# Patient Record
Sex: FEMALE | Race: WHITE | NOT HISPANIC OR LATINO | Employment: FULL TIME | URBAN - METROPOLITAN AREA
[De-identification: names, ages, dates, MRNs, and addresses within clinical notes are randomized per-mention and may not be internally consistent; named-entity substitution may affect disease eponyms.]

---

## 2021-07-12 ENCOUNTER — OFFICE VISIT (OUTPATIENT)
Dept: INTERNAL MEDICINE | Facility: CLINIC | Age: 56
End: 2021-07-12

## 2021-07-12 VITALS
HEART RATE: 99 BPM | DIASTOLIC BLOOD PRESSURE: 70 MMHG | WEIGHT: 235.5 LBS | HEIGHT: 60 IN | TEMPERATURE: 97.5 F | BODY MASS INDEX: 46.23 KG/M2 | SYSTOLIC BLOOD PRESSURE: 118 MMHG | OXYGEN SATURATION: 98 %

## 2021-07-12 DIAGNOSIS — Z01.419 ENCOUNTER FOR WELL WOMAN EXAM WITH ROUTINE GYNECOLOGICAL EXAM: ICD-10-CM

## 2021-07-12 DIAGNOSIS — M79.7 FIBROMYALGIA: ICD-10-CM

## 2021-07-12 DIAGNOSIS — K21.9 HIATAL HERNIA WITH GERD: ICD-10-CM

## 2021-07-12 DIAGNOSIS — I10 ESSENTIAL HYPERTENSION: ICD-10-CM

## 2021-07-12 DIAGNOSIS — R53.82 CHRONIC FATIGUE: ICD-10-CM

## 2021-07-12 DIAGNOSIS — K44.9 HIATAL HERNIA WITH GERD: ICD-10-CM

## 2021-07-12 DIAGNOSIS — K76.0 NONALCOHOLIC FATTY LIVER DISEASE: ICD-10-CM

## 2021-07-12 DIAGNOSIS — E28.2 PCOS (POLYCYSTIC OVARIAN SYNDROME): ICD-10-CM

## 2021-07-12 DIAGNOSIS — R06.02 SOB (SHORTNESS OF BREATH): ICD-10-CM

## 2021-07-12 DIAGNOSIS — E11.65 TYPE 2 DIABETES MELLITUS WITH HYPERGLYCEMIA, WITHOUT LONG-TERM CURRENT USE OF INSULIN (HCC): Primary | ICD-10-CM

## 2021-07-12 DIAGNOSIS — Z00.00 HEALTHCARE MAINTENANCE: ICD-10-CM

## 2021-07-12 PROBLEM — E11.9 TYPE 2 DIABETES MELLITUS, WITHOUT LONG-TERM CURRENT USE OF INSULIN (HCC): Status: ACTIVE | Noted: 2021-07-12

## 2021-07-12 PROBLEM — E66.01 CLASS 3 SEVERE OBESITY WITH SERIOUS COMORBIDITY AND BODY MASS INDEX (BMI) OF 45.0 TO 49.9 IN ADULT (HCC): Status: ACTIVE | Noted: 2021-07-12

## 2021-07-12 PROBLEM — M19.90 ARTHRITIS: Status: ACTIVE | Noted: 2021-07-12

## 2021-07-12 PROCEDURE — 99386 PREV VISIT NEW AGE 40-64: CPT | Performed by: NURSE PRACTITIONER

## 2021-07-12 PROCEDURE — 93000 ELECTROCARDIOGRAM COMPLETE: CPT | Performed by: NURSE PRACTITIONER

## 2021-07-12 RX ORDER — LISINOPRIL AND HYDROCHLOROTHIAZIDE 12.5; 1 MG/1; MG/1
TABLET ORAL
COMMUNITY
Start: 2021-05-18

## 2021-07-12 RX ORDER — OMEPRAZOLE/SODIUM BICARBONATE 20MG-1.1G
CAPSULE ORAL
COMMUNITY
End: 2022-10-11

## 2021-07-12 NOTE — PROGRESS NOTES
"Subjective   Chen Stockton is a 55 y.o. female.     Chief Complaint   Patient presents with   • Diabetes     Pt is here as a new pt to [facility] care. Pt is fasting fo labs and pt c/o sob X 2 months.   • Shortness of Breath        History of Present Illness   She is here today as a new patient to establish care. She feels her health is \"not great.\" She has lost 15 lbs. She is trying to eat healthier now, bringing her lunch to work. She is seeing a chiropractor for chronic low back and hip pain with improvement in pain. She is not currently exercising.  Previous PCP in [facility] with last lab work completed over 3 months ago.     She has noticed intermittent SOA over the past 2 months. Symptoms began prior to moving from [facility]. She has episodes of \"gasping\" while awake and intermittent dry to productive cough. She has had some congestion recently, but this has improved. She is using benadryl at bedtime. SOA and gasping is improving. Gasping occurs at rest, not associated with activity. Denies any wheezing or nocturnal awakenings, orthopnea, PND, syncope or chest pain, has occasional snoring at night. She was tested for WEI in the past, negative work up. She has been trying to work on weight loss, down 15 lbs.     DM2- dx in 2014. She is currently on metformin 1000 mg BID. Last blood work over 3 months ago. Patient doing well with current medication regimen, compliant with medication schedule, denies adverse effects. She is due for diabetic eye exam.   HTN/LE edema- currently on lisinopril-hctz 10-12.5 mg daily. She still has bilateral LE edema daily, improves with elevation. She does not add salt to her diet. Denies any orthostasis. She completed a stress test and ECHO several years ago secondary to edema, negative work up per patient.   Hiatal hernia with GERD- currently on Zegerid OTC with symptom control. She had an EGD in 2018 or 2019, positive for H pylori, treated with prevpack. She has symptom control with reflux " precautions and daily Zegrid.  Fibromyalgia- dx several years ago. She has been seeing a chiropractor with improvement in pain. She is not currently exercising.   Fatty Liver- dx several years ago. She had a liver U/S completed showing fatty liver.     Colon cancer screen- she thinks she had a c-scope in 2018 or 2019 with EGD. C-scope was normal, recommended re-screen in 10 years.     GYN- G0. Hx of PCOS. Menses have been irregular since menarche. Sees GYN. Needs a referral to GYN in [place]. Last pap 2018, normal. No hx of abnormal. Never had a mammogram.     She lives with her brother who is partially disabled. She helps take care of him.     The following portions of the patient's history were reviewed and updated as appropriate: allergies, current medications, past family history, past medical history, past social history, past surgical history and problem list.    Review of Systems   Constitutional: Positive for fatigue (chronic). Negative for chills and fever.   HENT: Positive for postnasal drip. Negative for sore throat and trouble swallowing.    Eyes: Negative for visual disturbance.   Respiratory: Positive for cough and shortness of breath. Negative for apnea, choking, chest tightness and wheezing.    Cardiovascular: Positive for leg swelling (intermittent, improves with elevation). Negative for chest pain and palpitations.   Gastrointestinal: Negative for abdominal pain, nausea, vomiting and GERD.   Musculoskeletal: Positive for arthralgias (chronic).   Neurological: Negative.    Psychiatric/Behavioral: Negative for suicidal ideas and depressed mood. The patient is not nervous/anxious.        Objective   Physical Exam  Constitutional:       Appearance: She is well-developed.   Neck:      Thyroid: No thyroid mass, thyromegaly or thyroid tenderness.      Vascular: No carotid bruit.      Trachea: Trachea normal.   Cardiovascular:      Rate and Rhythm: Normal rate and regular rhythm.      Chest Wall: PMI is  not displaced.      Pulses:           Radial pulses are 2+ on the right side and 2+ on the left side.        Dorsalis pedis pulses are 2+ on the right side and 2+ on the left side.        Posterior tibial pulses are 2+ on the right side and 2+ on the left side.      Heart sounds: S1 normal and S2 normal.      Comments: Trace bilateral LE edema    Pulmonary:      Effort: Pulmonary effort is normal.      Breath sounds: Normal breath sounds.      Comments: Intermittent cough with deep breathing    Musculoskeletal:      Right lower leg: Edema present.      Left lower leg: Edema present.   Lymphadenopathy:      Head:      Right side of head: No submental, submandibular, tonsillar or occipital adenopathy.      Left side of head: No submental, submandibular, tonsillar or occipital adenopathy.      Cervical: No cervical adenopathy.   Skin:     General: Skin is warm and dry.      Capillary Refill: Capillary refill takes less than 2 seconds.      Nails: There is no clubbing.   Neurological:      Mental Status: She is alert and oriented to person, place, and time.   Psychiatric:         Attention and Perception: Attention normal.         Mood and Affect: Mood and affect normal.         Speech: Speech normal.         Behavior: Behavior normal.         Thought Content: Thought content normal.         Cognition and Memory: Cognition normal.         Vitals:    07/12/21 0825   BP: 118/70   Pulse: 99   Temp: 97.5 °F (36.4 °C)   SpO2: 98%      Body mass index is 45.99 kg/m².    Assessment/Plan   Diagnoses and all orders for this visit:    1. Type 2 diabetes mellitus with hyperglycemia, without long-term current use of insulin (CMS/[facility]) (Primary)  -     CBC & Differential  -     Comprehensive Metabolic Panel  -     Hepatitis C Antibody  -     Hemoglobin A1c  -     MicroAlbumin, Urine, Random - Urine, Clean Catch  -     Lipid Panel With LDL / HDL Ratio  -     TSH Rfx On Abnormal To Free T4  -     Vitamin B12  -     Folate  -      Ambulatory Referral to Ophthalmology    2. Chronic fatigue  -     CBC & Differential  -     Comprehensive Metabolic Panel  -     Hepatitis C Antibody  -     Hemoglobin A1c  -     MicroAlbumin, Urine, Random - Urine, Clean Catch  -     Lipid Panel With LDL / HDL Ratio  -     TSH Rfx On Abnormal To Free T4  -     Vitamin B12  -     Folate    3. Healthcare maintenance  -     CBC & Differential  -     Comprehensive Metabolic Panel  -     Hepatitis C Antibody  -     Hemoglobin A1c  -     MicroAlbumin, Urine, Random - Urine, Clean Catch  -     Lipid Panel With LDL / HDL Ratio  -     TSH Rfx On Abnormal To Free T4  -     Vitamin B12  -     Folate    4. SOB (shortness of breath)  -     CBC & Differential  -     Comprehensive Metabolic Panel  -     Hepatitis C Antibody  -     Hemoglobin A1c  -     MicroAlbumin, Urine, Random - Urine, Clean Catch  -     Lipid Panel With LDL / HDL Ratio  -     TSH Rfx On Abnormal To Free T4  -     Vitamin B12  -     Folate  -     ECG 12 Lead    5. Essential hypertension  -     CBC & Differential  -     Comprehensive Metabolic Panel  -     Hepatitis C Antibody  -     Hemoglobin A1c  -     MicroAlbumin, Urine, Random - Urine, Clean Catch  -     Lipid Panel With LDL / HDL Ratio  -     TSH Rfx On Abnormal To Free T4  -     Vitamin B12  -     Folate  -     Ambulatory Referral to Ophthalmology    6. Hiatal hernia with GERD  -     CBC & Differential  -     Comprehensive Metabolic Panel  -     Hepatitis C Antibody  -     Hemoglobin A1c  -     MicroAlbumin, Urine, Random - Urine, Clean Catch  -     Lipid Panel With LDL / HDL Ratio  -     TSH Rfx On Abnormal To Free T4  -     Vitamin B12  -     Folate    7. PCOS (polycystic ovarian syndrome)  -     CBC & Differential  -     Comprehensive Metabolic Panel  -     Hepatitis C Antibody  -     Hemoglobin A1c  -     MicroAlbumin, Urine, Random - Urine, Clean Catch  -     Lipid Panel With LDL / HDL Ratio  -     TSH Rfx On Abnormal To Free T4  -     Vitamin  B12  -     Folate  -     Ambulatory Referral to Gynecology    8. Fibromyalgia  -     CBC & Differential  -     Comprehensive Metabolic Panel  -     Hepatitis C Antibody  -     Hemoglobin A1c  -     MicroAlbumin, Urine, Random - Urine, Clean Catch  -     Lipid Panel With LDL / HDL Ratio  -     TSH Rfx On Abnormal To Free T4  -     Vitamin B12  -     Folate    9. Nonalcoholic fatty liver disease  -     CBC & Differential  -     Comprehensive Metabolic Panel  -     Hepatitis C Antibody  -     Hemoglobin A1c  -     MicroAlbumin, Urine, Random - Urine, Clean Catch  -     Lipid Panel With LDL / HDL Ratio  -     TSH Rfx On Abnormal To Free T4  -     Vitamin B12  -     Folate    10. Encounter for well woman exam with routine gynecological exam  -     Ambulatory Referral to Gynecology      SOB/DM2/Obesity/ECG today- Sinus rhythm with possible LVH, no ST-T segment changes, no baseline for comparison.    1. DM2- check labs. Continue to work on healthy eating and weight loss. Would consider addition of GLP1 or SGLT2 based on A1c to assist with weight loss and LE edema. Referral placed to ophthalmology for diabetic eye exam.  2. HTN/LE edema- BP well controlled. Instructed her to continue to work on weight loss and limiting sodium intake. Elevate legs above heart level. Work on starting regular exercise. Suggest wearing compression socks while at work.  3. Obesity- down 15 lbs. Promote weight reduction through healthy diet incorporating lean proteins, non-starchy vegetables, complex carbs, healthy fats, and moderate fruit intake. Work on portion control. Stay adequately hydrated with water. Limit sugary drinks. Incorporate regular aerobic exercise and strength-training.   4. SOB- ?Obesity versus stress/anxiety as it has improved some as she has lost weight. ECG today sinus rhythm with possible LVH, no ST-T segment changes, no baseline for comparison. Check labs today. Would consider PFT evaluation and possible referral to  cardiology for further evaluation as she is at high risk for ASCVD and has family hx of CHF.   5. Fibromyalgia- encouraged her to continue seeing chiropractor. Recommend that she start low impact regular exercise. Suggested yoga, Julio Chi and swimming/water aerobics. Continue to work on weight loss.  6. PCOS- referral placed to GYN for well woman exam.     C-scope/EGD- records requested    Check fasting labs today. F/u in 3 weeks for labs and CPE.

## 2021-07-13 LAB
ALBUMIN SERPL-MCNC: 3.9 G/DL (ref 3.5–5.2)
ALBUMIN/GLOB SERPL: 1.1 G/DL
ALP SERPL-CCNC: 97 U/L (ref 39–117)
ALT SERPL-CCNC: 17 U/L (ref 1–33)
AST SERPL-CCNC: 21 U/L (ref 1–32)
BASOPHILS # BLD AUTO: 0.04 10*3/MM3 (ref 0–0.2)
BASOPHILS NFR BLD AUTO: 0.6 % (ref 0–1.5)
BILIRUB SERPL-MCNC: 0.4 MG/DL (ref 0–1.2)
BUN SERPL-MCNC: 13 MG/DL (ref 6–20)
BUN/CREAT SERPL: 18.1 (ref 7–25)
CALCIUM SERPL-MCNC: 9.7 MG/DL (ref 8.6–10.5)
CHLORIDE SERPL-SCNC: 101 MMOL/L (ref 98–107)
CHOLEST SERPL-MCNC: 175 MG/DL (ref 0–200)
CO2 SERPL-SCNC: 26.1 MMOL/L (ref 22–29)
CREAT SERPL-MCNC: 0.72 MG/DL (ref 0.57–1)
EOSINOPHIL # BLD AUTO: 0.13 10*3/MM3 (ref 0–0.4)
EOSINOPHIL NFR BLD AUTO: 2 % (ref 0.3–6.2)
ERYTHROCYTE [DISTWIDTH] IN BLOOD BY AUTOMATED COUNT: 13 % (ref 12.3–15.4)
FOLATE SERPL-MCNC: 10.2 NG/ML (ref 4.78–24.2)
GLOBULIN SER CALC-MCNC: 3.5 GM/DL
GLUCOSE SERPL-MCNC: 230 MG/DL (ref 65–99)
HBA1C MFR BLD: 10.6 % (ref 4.8–5.6)
HCT VFR BLD AUTO: 34.9 % (ref 34–46.6)
HCV AB S/CO SERPL IA: <0.1 S/CO RATIO (ref 0–0.9)
HDLC SERPL-MCNC: 61 MG/DL (ref 40–60)
HGB BLD-MCNC: 11.5 G/DL (ref 12–15.9)
IMM GRANULOCYTES # BLD AUTO: 0.01 10*3/MM3 (ref 0–0.05)
IMM GRANULOCYTES NFR BLD AUTO: 0.2 % (ref 0–0.5)
LDLC SERPL CALC-MCNC: 96 MG/DL (ref 0–100)
LDLC/HDLC SERPL: 1.53 {RATIO}
LYMPHOCYTES # BLD AUTO: 1.83 10*3/MM3 (ref 0.7–3.1)
LYMPHOCYTES NFR BLD AUTO: 28.8 % (ref 19.6–45.3)
MCH RBC QN AUTO: 28.7 PG (ref 26.6–33)
MCHC RBC AUTO-ENTMCNC: 33 G/DL (ref 31.5–35.7)
MCV RBC AUTO: 87 FL (ref 79–97)
MICROALBUMIN UR-MCNC: 13.6 UG/ML
MONOCYTES # BLD AUTO: 0.43 10*3/MM3 (ref 0.1–0.9)
MONOCYTES NFR BLD AUTO: 6.8 % (ref 5–12)
NEUTROPHILS # BLD AUTO: 3.91 10*3/MM3 (ref 1.7–7)
NEUTROPHILS NFR BLD AUTO: 61.6 % (ref 42.7–76)
NRBC BLD AUTO-RTO: 0 /100 WBC (ref 0–0.2)
PLATELET # BLD AUTO: 237 10*3/MM3 (ref 140–450)
POTASSIUM SERPL-SCNC: 4.3 MMOL/L (ref 3.5–5.2)
PROT SERPL-MCNC: 7.4 G/DL (ref 6–8.5)
RBC # BLD AUTO: 4.01 10*6/MM3 (ref 3.77–5.28)
SODIUM SERPL-SCNC: 138 MMOL/L (ref 136–145)
TRIGL SERPL-MCNC: 102 MG/DL (ref 0–150)
TSH SERPL DL<=0.005 MIU/L-ACNC: 2.16 UIU/ML (ref 0.27–4.2)
VIT B12 SERPL-MCNC: 618 PG/ML (ref 211–946)
VLDLC SERPL CALC-MCNC: 18 MG/DL (ref 5–40)
WBC # BLD AUTO: 6.35 10*3/MM3 (ref 3.4–10.8)

## 2021-07-15 DIAGNOSIS — E66.01 CLASS 3 SEVERE OBESITY WITH SERIOUS COMORBIDITY AND BODY MASS INDEX (BMI) OF 45.0 TO 49.9 IN ADULT, UNSPECIFIED OBESITY TYPE (HCC): ICD-10-CM

## 2021-07-15 DIAGNOSIS — K76.0 NONALCOHOLIC FATTY LIVER DISEASE: ICD-10-CM

## 2021-07-15 DIAGNOSIS — E28.2 PCOS (POLYCYSTIC OVARIAN SYNDROME): ICD-10-CM

## 2021-07-15 DIAGNOSIS — E11.65 TYPE 2 DIABETES MELLITUS WITH HYPERGLYCEMIA, WITHOUT LONG-TERM CURRENT USE OF INSULIN (HCC): Primary | ICD-10-CM

## 2021-07-16 LAB
IRON SATN MFR SERPL: 11 % (ref 20–50)
IRON SERPL-MCNC: 51 MCG/DL (ref 37–145)
Lab: NORMAL
TIBC SERPL-MCNC: 456 MCG/DL
UIBC SERPL-MCNC: 405 MCG/DL (ref 112–346)
WRITTEN AUTHORIZATION: NORMAL

## 2021-08-11 DIAGNOSIS — R06.02 SHORTNESS OF BREATH: ICD-10-CM

## 2021-08-11 DIAGNOSIS — R53.82 CHRONIC FATIGUE: Primary | ICD-10-CM

## 2021-08-12 ENCOUNTER — OFFICE VISIT (OUTPATIENT)
Dept: CARDIOLOGY | Facility: CLINIC | Age: 56
End: 2021-08-12

## 2021-08-12 VITALS
BODY MASS INDEX: 46.53 KG/M2 | DIASTOLIC BLOOD PRESSURE: 74 MMHG | WEIGHT: 237 LBS | SYSTOLIC BLOOD PRESSURE: 128 MMHG | HEIGHT: 60 IN | HEART RATE: 99 BPM | RESPIRATION RATE: 18 BRPM

## 2021-08-12 DIAGNOSIS — R06.09 DYSPNEA ON EXERTION: Primary | ICD-10-CM

## 2021-08-12 DIAGNOSIS — I10 ESSENTIAL HYPERTENSION: ICD-10-CM

## 2021-08-12 DIAGNOSIS — R07.89 CHEST PAIN, ATYPICAL: ICD-10-CM

## 2021-08-12 PROCEDURE — 99204 OFFICE O/P NEW MOD 45 MIN: CPT | Performed by: INTERNAL MEDICINE

## 2021-08-12 NOTE — PROGRESS NOTES
Subjective:     Encounter Date:08/12/2021      Patient ID: Chen Stockton is a 55 y.o. female.    Chief Complaint:  Chief Complaint   Patient presents with   • Shortness of Breath       HPI:  Ms Stockton is a 56 yo referred for evaluation of fatigue, dyspnea and chest pain.  Her past medical history is significant for HTN, HLD, and DM.  She describes having a 3 month history of dyspnea which occurs with exertion but also at rest she feels the need to breath deeply.  She does not have PND or orthopnea but does have chronic right ankle edema.  She has occasional palpitations but no sustained tachycardia.  She also describes having substernal chest discomfort like a weight on her chest which is nonradiating and is not associated with nausea or diaphoresis.  The pain can occur at rest or with exertion.    She has not had any prior cardiac evaluation.         She lives with her brother who is partially disabled. She helps take care of him.   The following portions of the patient's history were reviewed and updated as appropriate: allergies, current medications, past family history, past medical history, past social history, past surgical history and problem list.    Problem List:  Patient Active Problem List   Diagnosis   • Type 2 diabetes mellitus, without long-term current use of insulin (CMS/HCC)   • Arthritis   • PCOS (polycystic ovarian syndrome)   • Hiatal hernia with GERD   • Essential hypertension   • Fibromyalgia   • Class 3 severe obesity with serious comorbidity and body mass index (BMI) of 45.0 to 49.9 in adult (CMS/HCC)   • Nonalcoholic fatty liver disease   • Chest pain, atypical   • Dyspnea on exertion       Past Medical History:  Past Medical History:   Diagnosis Date   • Diabetes mellitus (CMS/HCC)    • Hypertension        Past Surgical History:  Past Surgical History:   Procedure Laterality Date   • SKIN GRAFT  1988       Social History:  Social History     Socioeconomic History   • Marital status:  "     Spouse name: Not on file   • Number of children: Not on file   • Years of education: Not on file   • Highest education level: Not on file   Tobacco Use   • Smoking status: Former Smoker     Packs/day: 1.00     Years: 10.00     Pack years: 10.00     Types: Cigarettes     Quit date:      Years since quittin.6   • Smokeless tobacco: Never Used   Vaping Use   • Vaping Use: Never used   Substance and Sexual Activity   • Alcohol use: Not Currently   • Drug use: Never   • Sexual activity: Not Currently       Allergies:  No Known Allergies    Immunizations:  Immunization History   Administered Date(s) Administered   • COVID-19 (PFIZER) 2021, 2021       ROS:  Review of Systems   Constitutional: Positive for malaise/fatigue.   Cardiovascular: Positive for chest pain, dyspnea on exertion, irregular heartbeat and leg swelling. Negative for near-syncope, orthopnea, palpitations, paroxysmal nocturnal dyspnea and syncope.   Respiratory: Positive for shortness of breath.    All other systems reviewed and are negative.         Objective:         /74 (BP Location: Right arm, Patient Position: Sitting)   Pulse 99   Resp 18   Ht 152.4 cm (60\")   Wt 108 kg (237 lb)   BMI 46.29 kg/m²     Constitutional:       General: Not in acute distress.     Appearance: Well-developed.   Eyes:      General: No scleral icterus.     Conjunctiva/sclera: Conjunctivae normal.      Pupils: Pupils are equal, round, and reactive to light.   HENT:      Head: Normocephalic and atraumatic.   Neck:      Thyroid: No thyromegaly.   Pulmonary:      Effort: Pulmonary effort is normal.      Breath sounds: Normal breath sounds.   Cardiovascular:      Normal rate. Regular rhythm.   Abdominal:      General: Bowel sounds are normal.      Palpations: Abdomen is soft.   Musculoskeletal: Normal range of motion.      Cervical back: Normal range of motion. Skin:     General: Skin is warm and dry.   Neurological:      Mental Status: " Alert and oriented to person, place, and time.         In-Office Procedure(s):  Procedures    ASCVD RIsk Score::  The 10-year ASCVD risk score (Beto ELIZONDO Jr., et al., 2013) is: 3.6%    Values used to calculate the score:      Age: 55 years      Sex: Female      Is Non- : No      Diabetic: Yes      Tobacco smoker: No      Systolic Blood Pressure: 128 mmHg      Is BP treated: Yes      HDL Cholesterol: 61 mg/dL      Total Cholesterol: 175 mg/dL    Recent Radiology:  Imaging Results (Most Recent)     None          Lab Review:   Office Visit on 07/12/2021   Component Date Value   • WBC 07/12/2021 6.35    • RBC 07/12/2021 4.01    • Hemoglobin 07/12/2021 11.5*   • Hematocrit 07/12/2021 34.9    • MCV 07/12/2021 87.0    • MCH 07/12/2021 28.7    • MCHC 07/12/2021 33.0    • RDW 07/12/2021 13.0    • Platelets 07/12/2021 237    • Neutrophil Rel % 07/12/2021 61.6    • Lymphocyte Rel % 07/12/2021 28.8    • Monocyte Rel % 07/12/2021 6.8    • Eosinophil Rel % 07/12/2021 2.0    • Basophil Rel % 07/12/2021 0.6    • Neutrophils Absolute 07/12/2021 3.91    • Lymphocytes Absolute 07/12/2021 1.83    • Monocytes Absolute 07/12/2021 0.43    • Eosinophils Absolute 07/12/2021 0.13    • Basophils Absolute 07/12/2021 0.04    • Immature Granulocyte Rel* 07/12/2021 0.2    • Immature Grans Absolute 07/12/2021 0.01    • nRBC 07/12/2021 0.0    • Glucose 07/12/2021 230*   • BUN 07/12/2021 13    • Creatinine 07/12/2021 0.72    • eGFR Non African Am 07/12/2021 84    • eGFR  Am 07/12/2021 102    • BUN/Creatinine Ratio 07/12/2021 18.1    • Sodium 07/12/2021 138    • Potassium 07/12/2021 4.3    • Chloride 07/12/2021 101    • Total CO2 07/12/2021 26.1    • Calcium 07/12/2021 9.7    • Total Protein 07/12/2021 7.4    • Albumin 07/12/2021 3.90    • Globulin 07/12/2021 3.5    • A/G Ratio 07/12/2021 1.1    • Total Bilirubin 07/12/2021 0.4    • Alkaline Phosphatase 07/12/2021 97    • AST (SGOT) 07/12/2021 21    • ALT (SGPT)  07/12/2021 17    • Hep C Virus Ab 07/12/2021 <0.1    • Hemoglobin A1C 07/12/2021 10.60*   • Microalbumin, Urine 07/12/2021 13.6    • Total Cholesterol 07/12/2021 175    • Triglycerides 07/12/2021 102    • HDL Cholesterol 07/12/2021 61*   • VLDL Cholesterol Charles 07/12/2021 18    • LDL Chol Calc ([facility]) 07/12/2021 96    • LDL/HDL RATIO 07/12/2021 1.53    • TSH 07/12/2021 2.160    • Vitamin B-12 07/12/2021 618    • Folate 07/12/2021 10.20    • TIBC 07/12/2021 456    • UIBC 07/12/2021 405*   • Iron 07/12/2021 51    • Iron Saturation 07/12/2021 11*   • Please note 07/12/2021 Comment    • Written Authorization 07/12/2021 Comment                 Assessment:          Diagnosis Plan   1. Dyspnea on exertion     2. Essential hypertension     3. Chest pain, atypical            Plan:      1. Atypical chest pain - both typical and atypical feature of angina, risk factors include family history, DM and HTN.  Will schedule for a Lexiscan.  2. Dyspnea - no signs of heart failure on exam, will get echo, stress test and event monitor.  3. HTN - controlled on lisinopril/HCTZ  4. DM - metformin      Level of Care:                 Flash Camarillo MD  08/12/21  .

## 2021-08-13 DIAGNOSIS — R06.09 DYSPNEA ON EXERTION: ICD-10-CM

## 2021-08-13 DIAGNOSIS — R07.89 CHEST PAIN, ATYPICAL: Primary | ICD-10-CM

## 2021-08-26 ENCOUNTER — TELEMEDICINE (OUTPATIENT)
Dept: INTERNAL MEDICINE | Facility: CLINIC | Age: 56
End: 2021-08-26

## 2021-08-26 DIAGNOSIS — N10 ACUTE PYELONEPHRITIS: Primary | ICD-10-CM

## 2021-08-26 PROCEDURE — 99213 OFFICE O/P EST LOW 20 MIN: CPT | Performed by: NURSE PRACTITIONER

## 2021-08-26 RX ORDER — LEVOFLOXACIN 750 MG/1
750 TABLET ORAL DAILY
Qty: 5 TABLET | Refills: 0 | Status: SHIPPED | OUTPATIENT
Start: 2021-08-26 | End: 2021-08-31

## 2021-08-26 NOTE — PROGRESS NOTES
Subjective   Chen Stockton is a 55 y.o. female.     Chief Complaint   Patient presents with   • Urinary Frequency   • dark urine   • Fever     6 days   • Difficulty Urinating        History of Present Illness   You have chosen to receive care through a telehealth visit.  Do you consent to use a video/audio connection for your medical care today? Yes    She is here today for a telehealth visit using [employer] with c/o dysuria, difficulty urinating, dark urine and fever that began 6 days ago. Tmax 102. She has been trying to hydrate well with fluids and drinking cranberry juice. She has had some nausea with this.   Symptoms have become worse.   She had a sore throat initially a week ago, thought this may be related to allergies. This resolved.   Positive hx of frequent UTIs.     The following portions of the patient's history were reviewed and updated as appropriate: allergies, current medications, past family history, past medical history, past social history, past surgical history and problem list.    Review of Systems   Constitutional: Positive for fatigue and fever. Negative for chills.   Respiratory: Negative for cough, chest tightness, shortness of breath and wheezing.    Cardiovascular: Negative for chest pain, palpitations and leg swelling.   Genitourinary: Positive for difficulty urinating, dysuria and frequency. Negative for hematuria (dark colored urine).   Psychiatric/Behavioral: Negative for suicidal ideas and depressed mood. The patient is not nervous/anxious.        Objective   Physical Exam  Constitutional:       General: She is awake.      Appearance: Normal appearance.   Pulmonary:      Effort: Pulmonary effort is normal.   Neurological:      Mental Status: She is alert and oriented to person, place, and time. Mental status is at baseline.   Psychiatric:         Attention and Perception: Attention and perception normal.         Mood and Affect: Mood and affect normal.         Speech: Speech normal.          Behavior: Behavior normal. Behavior is cooperative.         Thought Content: Thought content normal.         Cognition and Memory: Cognition and memory normal.         Judgment: Judgment normal.         There were no vitals filed for this visit.       Assessment/Plan   Diagnoses and all orders for this visit:    1. Acute pyelonephritis (Primary)  -     levoFLOXacin (Levaquin) 750 MG tablet; Take 1 tablet by mouth Daily for 5 days.  Dispense: 5 tablet; Refill: 0      1. Acute pyelonephritis- start levaquin 750 mg once daily x 5 days. She will bring in urine sample today to send for culture. Hydrate well with fluids. Tylenol PRN for fever. Monitor for tendon pain, stop antibiotic if this occurs. Continue probiotic separate from antibiotic. Notify for worsening symptoms. To ER with severe symptoms. Re-check U/A in 2 weeks from completing antibiotic.     Doximity visit lasting 15 minutes.

## 2021-08-27 DIAGNOSIS — N10 ACUTE PYELONEPHRITIS: Primary | ICD-10-CM

## 2021-08-29 LAB
BACTERIA UR CULT: NO GROWTH
BACTERIA UR CULT: NORMAL

## 2021-10-12 ENCOUNTER — APPOINTMENT (OUTPATIENT)
Dept: NUCLEAR MEDICINE | Facility: HOSPITAL | Age: 56
End: 2021-10-12

## 2021-10-12 ENCOUNTER — APPOINTMENT (OUTPATIENT)
Dept: CARDIOLOGY | Facility: HOSPITAL | Age: 56
End: 2021-10-12

## 2021-10-12 ENCOUNTER — CLINICAL SUPPORT (OUTPATIENT)
Dept: INTERNAL MEDICINE | Facility: CLINIC | Age: 56
End: 2021-10-12

## 2021-10-12 DIAGNOSIS — Z20.822 EXPOSURE TO COVID-19 VIRUS: Primary | ICD-10-CM

## 2021-10-13 LAB
LABCORP SARS-COV-2, NAA 2 DAY TAT: NORMAL
SARS-COV-2 RNA RESP QL NAA+PROBE: NOT DETECTED

## 2021-12-24 DIAGNOSIS — K76.0 NONALCOHOLIC FATTY LIVER DISEASE: ICD-10-CM

## 2021-12-24 DIAGNOSIS — E28.2 PCOS (POLYCYSTIC OVARIAN SYNDROME): ICD-10-CM

## 2021-12-24 DIAGNOSIS — E11.65 TYPE 2 DIABETES MELLITUS WITH HYPERGLYCEMIA, WITHOUT LONG-TERM CURRENT USE OF INSULIN (HCC): ICD-10-CM

## 2021-12-24 DIAGNOSIS — E66.01 CLASS 3 SEVERE OBESITY WITH SERIOUS COMORBIDITY AND BODY MASS INDEX (BMI) OF 45.0 TO 49.9 IN ADULT, UNSPECIFIED OBESITY TYPE (HCC): ICD-10-CM

## 2021-12-27 RX ORDER — ORAL SEMAGLUTIDE 7 MG/1
TABLET ORAL
Qty: 30 TABLET | Refills: 11 | OUTPATIENT
Start: 2021-12-27

## 2022-09-19 ENCOUNTER — TELEPHONE (OUTPATIENT)
Dept: INTERNAL MEDICINE | Facility: CLINIC | Age: 57
End: 2022-09-19

## 2022-09-19 NOTE — TELEPHONE ENCOUNTER
Caller: Chen Stockton    Relationship: Self    Best call back number: [phone]    What is the best time to reach you: ANY    Who are you requesting to speak with (clinical staff, provider,  specific staff member): CLINICAL    What was the call regarding: PATIENT IS CURRENTLY A PATIENT OF [facility] AND WOULD LIKE TO SWITCH HER PCP TO DR. ULLOA. PLEASE ADVISE.    Do you require a callback: YES